# Patient Record
Sex: FEMALE | Race: WHITE | Employment: OTHER | URBAN - METROPOLITAN AREA
[De-identification: names, ages, dates, MRNs, and addresses within clinical notes are randomized per-mention and may not be internally consistent; named-entity substitution may affect disease eponyms.]

---

## 2018-07-03 ENCOUNTER — HOSPITAL ENCOUNTER (OUTPATIENT)
Dept: PHYSICAL THERAPY | Age: 68
Discharge: HOME | End: 2018-07-03
Payer: MEDICARE

## 2018-07-03 PROCEDURE — 97110 THERAPEUTIC EXERCISES: CPT

## 2018-07-03 PROCEDURE — G8981 BODY POS CURRENT STATUS: HCPCS

## 2018-07-03 PROCEDURE — G8982 BODY POS GOAL STATUS: HCPCS

## 2018-07-03 PROCEDURE — 97162 PT EVAL MOD COMPLEX 30 MIN: CPT
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
## 2018-07-03 NOTE — PROGRESS NOTES
Concepción Jones  : 1950 [facility]  [address] [place] [address]  [phone]   [phone]   OUTPATIENT ORTHOPAEDIC PHYSICAL THERAPY    NAME/AGE/GENDER: Concepción Jones is a 78 y.o. female. DATE: 7/3/2018                         Ambulatory/Rehab Services H2 Model Falls Risk Assessment    Risk Factor Pts. ·   Confusion/Disorientation/Impulsivity  []      4 ·   Symptomatic Depression  []     2 ·   Altered Elimination  []     1 ·   Dizziness/Vertigo  []     1 ·   Gender (Male)  []     1 ·   Any administered antiepileptics (anticonvulsants):  []     2 ·   Any administered benzodiazepines:  []     1 ·   Visual Impairment (specify):  []     1 ·   Portable Oxygen Use  []     1 ·   Orthostatic ? BP  []     1 ·   History of Recent Falls (within 3 mos.)  []     5     Ability to Rise from Chair (choose one) Pts. ·   Ability to rise in a single movement  []     0 ·   Pushes up, successful in one attempt  [x]     1 ·   Multiple attempts, but unsuccessful  []     3 ·   Unable to rise without assistance  []     4   Total: (5 or greater = High Risk) 1     Falls Prevention Plan:   []                  Physical Limitations to Exercise (specify):   []                  Mobility Assistance Device (type):   []                  Exercise/Equipment Adaptation (specify):    © [employer] [address] [place] [address] Patent #[id number].  Federal Law prohibits the replication, distribution or use without written permission from [employer] of [employer], OCS, Florencio Davison CFMT

## 2018-07-10 ENCOUNTER — APPOINTMENT (OUTPATIENT)
Dept: PHYSICAL THERAPY | Age: 68
End: 2018-07-10
Payer: MEDICARE

## 2018-07-10 ENCOUNTER — HOSPITAL ENCOUNTER (OUTPATIENT)
Dept: PHYSICAL THERAPY | Age: 68
Discharge: HOME | End: 2018-07-10
Payer: MEDICARE

## 2018-07-10 PROCEDURE — 97110 THERAPEUTIC EXERCISES: CPT

## 2018-07-10 PROCEDURE — 97140 MANUAL THERAPY 1/> REGIONS: CPT
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
## 2018-07-19 ENCOUNTER — HOSPITAL ENCOUNTER (OUTPATIENT)
Dept: PHYSICAL THERAPY | Age: 68
Discharge: HOME | End: 2018-07-19
Payer: MEDICARE

## 2018-07-19 ENCOUNTER — APPOINTMENT (OUTPATIENT)
Dept: PHYSICAL THERAPY | Age: 68
End: 2018-07-19
Payer: MEDICARE

## 2018-07-19 PROCEDURE — 97110 THERAPEUTIC EXERCISES: CPT

## 2018-07-19 PROCEDURE — 97140 MANUAL THERAPY 1/> REGIONS: CPT
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
## 2018-07-24 ENCOUNTER — APPOINTMENT (OUTPATIENT)
Dept: PHYSICAL THERAPY | Age: 68
End: 2018-07-24
Payer: MEDICARE

## 2018-07-24 ENCOUNTER — HOSPITAL ENCOUNTER (OUTPATIENT)
Dept: PHYSICAL THERAPY | Age: 68
Discharge: HOME | End: 2018-07-24
Payer: MEDICARE

## 2018-07-24 PROCEDURE — 97110 THERAPEUTIC EXERCISES: CPT

## 2018-07-24 PROCEDURE — 97140 MANUAL THERAPY 1/> REGIONS: CPT
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
## 2018-08-02 ENCOUNTER — HOSPITAL ENCOUNTER (OUTPATIENT)
Dept: PHYSICAL THERAPY | Age: 68
Discharge: HOME | End: 2018-08-02
Payer: MEDICARE

## 2018-08-02 PROCEDURE — 97140 MANUAL THERAPY 1/> REGIONS: CPT

## 2018-08-02 PROCEDURE — 97110 THERAPEUTIC EXERCISES: CPT
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
## 2018-08-09 ENCOUNTER — HOSPITAL ENCOUNTER (OUTPATIENT)
Dept: PHYSICAL THERAPY | Age: 68
Discharge: HOME | End: 2018-08-09
Payer: MEDICARE

## 2018-08-09 PROCEDURE — 97140 MANUAL THERAPY 1/> REGIONS: CPT
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
## 2018-08-14 ENCOUNTER — HOSPITAL ENCOUNTER (OUTPATIENT)
Dept: PHYSICAL THERAPY | Age: 68
Discharge: HOME | End: 2018-08-14
Payer: MEDICARE

## 2018-08-14 PROCEDURE — 97110 THERAPEUTIC EXERCISES: CPT

## 2018-08-14 PROCEDURE — 97140 MANUAL THERAPY 1/> REGIONS: CPT
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
## 2018-08-21 ENCOUNTER — HOSPITAL ENCOUNTER (OUTPATIENT)
Dept: PHYSICAL THERAPY | Age: 68
Discharge: HOME | End: 2018-08-21
Payer: MEDICARE

## 2018-08-21 PROCEDURE — 97110 THERAPEUTIC EXERCISES: CPT

## 2018-08-21 PROCEDURE — 97140 MANUAL THERAPY 1/> REGIONS: CPT
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
## 2018-08-22 NOTE — PROGRESS NOTES
I am accessing Ms. Stephenson's chart as a part of our department's internal chart auditing process. I certify that Ms. Robyn Santos is, or was, a patient in our department.   Thank you,  Garrick Barker, PT  8/22/2018

## 2018-08-29 NOTE — PROGRESS NOTES
I am accessing Ms. Stephenson's chart as a part of our department's internal chart auditing process. I certify that Ms. Barbara Rios is, or was, a patient in our department.   Thank you,  Shadi Koroma, PT  8/29/2018

## 2018-08-30 ENCOUNTER — HOSPITAL ENCOUNTER (OUTPATIENT)
Dept: PHYSICAL THERAPY | Age: 68
Discharge: HOME | End: 2018-08-30
Payer: MEDICARE

## 2018-08-30 PROCEDURE — 97110 THERAPEUTIC EXERCISES: CPT

## 2018-08-30 PROCEDURE — 97140 MANUAL THERAPY 1/> REGIONS: CPT
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
## 2018-09-06 ENCOUNTER — HOSPITAL ENCOUNTER (OUTPATIENT)
Dept: PHYSICAL THERAPY | Age: 68
Discharge: HOME | End: 2018-09-06
Payer: MEDICARE

## 2018-09-06 PROCEDURE — 97140 MANUAL THERAPY 1/> REGIONS: CPT

## 2018-09-06 PROCEDURE — 97110 THERAPEUTIC EXERCISES: CPT
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
## 2018-09-11 ENCOUNTER — HOSPITAL ENCOUNTER (OUTPATIENT)
Dept: PHYSICAL THERAPY | Age: 68
Discharge: HOME | End: 2018-09-11
Payer: MEDICARE

## 2018-09-11 PROCEDURE — G8981 BODY POS CURRENT STATUS: HCPCS

## 2018-09-11 PROCEDURE — G8982 BODY POS GOAL STATUS: HCPCS

## 2018-09-11 PROCEDURE — 97140 MANUAL THERAPY 1/> REGIONS: CPT

## 2018-09-11 PROCEDURE — 97110 THERAPEUTIC EXERCISES: CPT
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
## 2018-09-25 ENCOUNTER — HOSPITAL ENCOUNTER (OUTPATIENT)
Dept: PHYSICAL THERAPY | Age: 68
Discharge: HOME | End: 2018-09-25
Payer: MEDICARE

## 2018-09-25 PROCEDURE — 97140 MANUAL THERAPY 1/> REGIONS: CPT

## 2018-09-25 PROCEDURE — 97110 THERAPEUTIC EXERCISES: CPT
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
## 2018-10-25 ENCOUNTER — APPOINTMENT (OUTPATIENT)
Dept: PHYSICAL THERAPY | Age: 68
End: 2018-10-25

## 2018-10-31 ENCOUNTER — APPOINTMENT (OUTPATIENT)
Dept: PHYSICAL THERAPY | Age: 68
End: 2018-10-31

## 2018-11-07 ENCOUNTER — HOSPITAL ENCOUNTER (OUTPATIENT)
Dept: PHYSICAL THERAPY | Age: 68
Discharge: HOME | End: 2018-11-07
Payer: MEDICARE

## 2018-11-07 PROCEDURE — 97110 THERAPEUTIC EXERCISES: CPT

## 2018-11-07 PROCEDURE — G8982 BODY POS GOAL STATUS: HCPCS

## 2018-11-07 PROCEDURE — 97140 MANUAL THERAPY 1/> REGIONS: CPT

## 2018-11-07 PROCEDURE — G8983 BODY POS D/C STATUS: HCPCS
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
## 2022-03-29 NOTE — THERAPY EVALUATION
Cecilio Balderas  : 1950  [place]  Secondary: [place] [id number] at [facility]  [address] [place] [address]  [phone]   [phone]        OUTPATIENT PHYSICAL THERAPY:Initial Assessment 3/30/2022   ICD-10: Treatment Diagnosis: Lack of coordination (muscle incoordination) (R27.8), Pelvic floor dysfunction in female (M62.98), Generalized weakness (M62.81) and Urge incontinence (N39.41)  Precautions/Allergies:   Bactrim [sulfamethoprim], Ciprofloxacin, and Pcn [penicillins]   TREATMENT PLAN:  Effective Dates: 3/30/2022 TO 2022 (90 days). Frequency/Duration: 1 time a week for 90 Day(s) MEDICAL/REFERRING DIAGNOSIS:  Interstitial cystitis (chronic) without hematuria [N30.10]   DATE OF ONSET:   REFERRING PHYSICIAN: Bret Reddy MD Orders: evaluate and treat  Return MD Appointment: 22     INITIAL ASSESSMENT: Cecilio Balderas presents with musculoskeletal limitations including reduced coordination and awareness of PFM and decreased pelvic floor muscle (PFM) strength. These limitations are impairing the patient's ability to properly coordinate perineal elevation and descent for normalized PFM function, contributing to urinary dysfunction. As a result, the patient is limited in her/his ability to participate in household chores, traveling by car or bus for a distance greater then 30 minutes away from home and social activities outside of the home. PROBLEM LIST (Impacting functional limitations):  Decreased Swisher with Home Exercise Program  Decreased coordination  Decreased strength of pelvic floor which limits bladder control   INTERVENTIONS PLANNED:  1. Family Education  2. Home Exercise Program (HEP)  3. Manual Therapy  4. Neuromuscular Re-education/Strengthening  5. Therapeutic Activites  6.  Therapeutic Exercise/Strengthening     GOALS: (Goals have been discussed and agreed upon with patient.)  Short-Term Functional Goals: Time Frame:   1. Pt will demonstrate I with basic PFM HEP to improve awareness, coordination, and timing of PFM. 2. Patient will demonstrate ability to isolate a pelvic floor contraction without breath holding and minimal to no accessory muscle use in order to implement the knack and/or urge suppression  3. Patient will demonstrate appropriate use of the pelvic floor muscle group (quick flicks and/or drops), without compensation, to implement urge suppression appropriately with urgency of urination and decrease the number of pads per day or UI episodes  4. Patient will demonstrate appropriate co-contraction of the transversus abdominis and pelvic floor muscle group during exhalation in order to reduce IAP and improve functional task performance   Discharge Goals: Time Frame:   1. Patient will demonstrate independence with an advanced HEP for general conditioning, core stabilization, and mobility to facilitate carry over and independent management of symptoms. OUTCOME MEASURE:   Tool Used: Pelvic Floor Impact Questionnaireshort form 7 (PFIQ-7)   Score:  Initial:   · Bladder or Urine: 42/100  · Bowel or Rectum: 0/100  · Vagina or Pelvis: 0/100 Most Recent: X (Date: -- )  · Bladder or Urine: X  · Bowel or Rectum: X  · Vagina or Pelvis: X   Interpretation of Score: Each of the 7 sections is scored on a scale from 0-3; 0 representing \"Not at all\", 3 representing \"Quite a bit\". The mean value is taken from all the answered items, then multiplied by 100 to obtain the scale score, ranging from 0-100. This process is repeated for each column representing bowel, bladder, and pelvic pain. Medical Necessity:   · Patient demonstrates GOOD rehab potential due to higher previous functional level. Reason for Services/Other Comments:  · Patient continues to require skilled intervention due to above mentioned deficits  .   Total Duration:   PT Patient Time In/Time Out  Time In: 1300  Time Out: 1400    Rehabilitation Potential For Stated Goals: Good  Regarding Saul Stephenson's therapy, I certify that the treatment plan above will be carried out by a therapist or under their direction. Thank you for this referral,  Asaf Lovett DPT     Referring Physician Signature: Dane Cox,* _______________________________ Date _____________              PAIN/SUBJECTIVE:   Initial:   0 Post Session:  0/10     HISTORY:   History of Present Injury/Illness (Reason for Referral):  Ms. Rigo Palm is a 81 yo female referred to pelvic floor physical therapy (PFPT) by Dane Cox,* 2/2 IC. Pt reports that symptoms began 2010. It started with an ache that intensified. She has been to several doctors. Gynecologists didn't know what to do. Went to urogynecologist which is when they made the diagnosis. Has tried bladder instillations which did not help. She ended up at pain management as was put on tramadol. Her gynecologist put her on elmiron. This combined with diet got things under control. She is avoiding tomatoes, citrus, berries, pecans. This past December her internist saw that Elmiron was not good and was told to get off of it. She has gradually weaned off of that. She also went to [facility] who then recommended PT, marshmallow root, and aloe vera. She hasn't had any pain. She is here without symptoms. Urinary: Frequency 5-8 x/day, 1-2 x/night. Positive for urge urinary incontinence. Negative for stress urinary incontinence, urinary urgency, urinary frequency, incomplete emptying, urinary hesitancy/intermittency, dysuria, hematuria, nocturia and nocturnal enuresis. Pt does use pads for urinary protection; 1 pads per day (PPD) (if she is going out or on a trip). Fluid intake: 1 liters water/day; bladder irritants include: none. Pt does not limit fluid intake due to bladder control. Bowel: Frequency once every 2-3 days.  Positive for pain with bowel movement, pushing/straining with bowel movement and constipation. Negative for fecal incontinence and incomplete emptying. Pt does not use pads for bowel protection; 0 pads per day (PPD). Use of stool softeners or laxatives? NO. Uses [place] wafer ever morning (fiber). Has IBS which is controlled by medicine  Sexual: Pt is not sexually active  positive for dyspareunia (history of). Pelvic Organ Prolapse/Pelvic Pain: Location: none. OB History: Number of pregnancies: 1 Number of vaginal deliveries: 1 Number of c-sections: 1. Past Medical History/Comorbidities:   Ms. Erlinda Sparrow  has a past medical history of GERD (gastroesophageal reflux disease), Hypertension, Musculoskeletal disorder, and Psychiatric disorder. Ms. Erlinda Sparrow  has a past surgical history that includes pr amputation low leg thru tib/fib; pr partial hip replacement (Right, 11/20/1/2017); and hx pacemaker (06/10/2015). Social History/Living Environment:      Have you ever had any pelvic trauma (orthopedic in nature, fall, MVA, etc.)? 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 NO   Have you ever experienced any form of medical trauma (GYN, urological, GI, etc)? NO     Prior Level of Function/Work/Activity:  Prior level of function: Modified Independent uses WC for mobility   Occupation: not working  Exercise activities: not anymore    Personal Factors:          Sex:  female        Age:  80 y.o.      Ambulatory/Rehab Services H2 Model Falls Risk Assessment    Risk Factors:       No Risk Factors Identified Ability to Rise from Chair:       (0)  Ability to rise in a single movement    Falls Prevention Plan:       No modifications necessary   Total: (5 or greater = High Risk): 0 ©2010 [employer] [place] . [employer] Patent #[id number]. Federal Law prohibits the replication, distribution or use without written permission from [employer]     Current Medications:       Current Outpatient Medications:     gabapentin (NEURONTIN) 300 mg capsule, Take 300 mg by mouth three (3) times daily. , Disp: , Rfl:     traZODone (DESYREL) 50 mg tablet, Take 50 mg by mouth nightly.  , Disp: , Rfl:     pentosan polysulfate sodium (ELMIRON) 100 mg capsule, Take 100 mg by mouth two (2) times a day.  , Disp: , Rfl:     sertraline (ZOLOFT) 50 mg tablet, Take  by mouth daily. , Disp: , Rfl:     LORazepam (ATIVAN) 0.5 mg tablet, Take 0.5 mg by mouth.  , Disp: , Rfl:     temazepam (RESTORIL) 30 mg capsule, Take 30 mg by mouth nightly.  , Disp: , Rfl:     levothyroxine (SYNTHROID) 200 mcg tablet, Take 200 mcg by mouth Daily (before breakfast). , Disp: , Rfl:     lidocaine (LIDODERM) 5 %(700 mg/patch), 1 Patch by TransDERmal route every twenty-four (24) hours. , Disp: , Rfl:     estradiol (ESTRACE) 0.5 mg tablet, Take  by mouth daily.   , Disp: , Rfl:     CALCIUM CITRATE (CITRACAL PO), Take  by mouth.  , Disp: , Rfl:     tamoxifen (NOLVADEX) 20 mg tablet, Take 20 mg by mouth two (2) times a day.  , Disp: , Rfl:     Dexlansoprazole (DEXILANT) 60 mg CpDM, Take  by mouth.  , Disp: , Rfl:     oxyCODONE-acetaminophen (PERCOCET) 2.5-325 mg per tablet, Take 1 Tab by mouth every six (6) hours as needed for Pain., Disp: 10 Tab, Rfl: 0   Date Last Reviewed:  3/30/2022   Number of Personal Factors/Comorbidities that affect the Plan of Care: 0: LOW COMPLEXITY   EXAMINATION:   OBSERVATION:   External Observations:   Voluntary contraction: [] absent     [x] present   Involuntary contraction: [] absent     [x] present   Involuntary relaxation: [] absent     [x] present   Perineal descent at rest: [] absent     [x] present   Perineal descent with bearing: [] absent     [x] present   Perineum during cough \"the Knack\" - intact   Clitoral Roach Mobility: decreased   Postural assessment: bilateral BKA's   Gait: uses WC for longer ambulation    Pelvic Floor Muscle (PFM) Assessment:   Vaginal vault size: [] decreased     [] increased     [x] WNL   Muscle volume: [] decreased     [x] WNL     [] Defect   PFM tension: [x] decreased     [] increased     [] WNL    Contraction ability:  Voluntary contraction: [] absent     [x] weak     [] moderate      [] strong  Voluntary relaxation: [] absent     [] partial     [x] complete   MMT: 2/5   PFM endurance: 3 seconds   Repetitions of endurance contractions: 5  Overflow: [] absent     [] min     [] mod     [] severe / Compensatory mm groups include       Tissue laxity test:  Anterior wall: [] minimum     [] moderate     [] severe      [x] WNL  Posterior wall: [] minimum     [] moderate     [] severe      [x] WNL      Palpation: via vaginal canal   Superficial Pelvic Floor Musculature (PFM): Tender? Intermediate PFM Tender? Deep PFM Tender? Superficial Transverse Perineal [] Right  [] Left  []DNT Deep Transverse Perineal [] Right  [] Left  []DNT Puborectalis [] Right  [] Left  []DNT   Ischiocavernosus [] Right  [] Left  []DNT Compressor Urethra [] Right  [] Left  []DNT Pubococcygeus [] Right  [] Left  []DNT   Bulbocavernosus [] Right  [] Left  []DNT   Ileococcygeus [] Right  [] Left  []DNT       Obturator Internus [] Right  [] Left  []DNT       Coccygeus [] Right  [] Left  []DNT       Diastasis Recti    At umbilicus    1 inch above umbilicus    1 inch below umbilicus    TA contraction      Q tip test for vestibular tenderness Pain score 0-10   2 o'clock    4 o'clock    6 o'clock    8 o'clock    10 o'clock    12 o'clock    Composite score           Body Structures Involved:  1. Muscles Body Functions Affected:  1. Genitourinary  2. Neuromusculoskeletal  3. Movement Related Activities and Participation Affected:  1. Mobility  2.  Self Care  3. Domestic Life  4.  Community, Social and [place]   Number of elements (examined above) that affect the Plan of Care: 1-2: LOW COMPLEXITY   CLINICAL PRESENTATION:   Presentation: Stable and uncomplicated: LOW COMPLEXITY   CLINICAL DECISION MAKING:   Use of outcome tool(s) and clinical judgement create a POC that gives a: Clear prediction of patient's progress: LOW COMPLEXITY

## 2022-03-29 NOTE — PROGRESS NOTES
Naveen Johnson  : 1950  [place]  Secondary: [facility]  [address] [place] [address]  [phone]   [phone]        OUTPATIENT PHYSICAL THERAPY: Daily Treatment Note 3/30/2022  ICD-10: Treatment Diagnosis: Lack of coordination (muscle incoordination) (R27.8), Pelvic floor dysfunction in female (M62.98), Generalized weakness (M62.81) and Urge incontinence (N39.41)  Precautions/Allergies:   Bactrim [sulfamethoprim], Ciprofloxacin, and Pcn [penicillins]   TREATMENT PLAN:  Effective Dates: 3/30/2022 TO 2022 (90 days). Frequency/Duration: 1 time a week for 90 Day(s) MEDICAL/REFERRING DIAGNOSIS:  Interstitial cystitis (chronic) without hematuria [N30.10]   DATE OF ONSET:   REFERRING PHYSICIAN: Julio César Olivo MD Orders: evaluate and treat  Return MD Appointment: 22     Pre-treatment Symptoms/Complaints:  see evaluation  Pain: Initial:   0 Post Session:  0/10   Medications Last Reviewed:  3/30/2022   Updated Objective Findings:  See evaluation note from today   TREATMENT:   THERAPEUTIC EXERCISE: (10 minutes):  Exercises per grid below to improve mobility, strength, and coordination. Required minimal visual, verbal, and manual cues to promote proper body alignment, promote proper body posture, and promote proper body mechanics. Progressed resistance, range, and repetitions as indicated. All exercises performed with emphasis on kegel and breathing in order improve coordination, awareness and to minimize symptoms. Date:  3-30-22 Date:   Date:     Activity/Exercise Parameters Parameters Parameters   Patient Education Discussed HEP and POC     kegels Quick flicks and endurance holds                                      Pt gives verbal consent to internal  assessment/treatment no chaperon present.      NEURO REEDUCATION: () to improve control and coordination of pelvic floor     Date: Date:   Date:     Activity/Exercise Parameters Parameters Parameters   Biofeedback With sEMG was utilized for coordination of PFM. MANUAL THERAPY: () to improve soft tissue tone    Date Type Location Comments   3/30/2022 Internal assessment/treatment                                           (Used abbreviations: MET - muscle energy technique; SCS- Strain counter strain; CTM-Connective tissue mobilizations; CR- Contract/relax; SP- Sustained pressure, TrP-Trigger point release, IASTM- Instrument assisted soft tissue mobilizations, TDN-Trigger point dry needling)      [facility]     Treatment/Session Summary:  Pt reports good understanding of plan of care, as well as prescribed home exercise program.  All questions were answered to pt's satisfaction. Pt was invited to call with any further questions or concerns. · Response to Treatment:  see evaluation. · Communication/Consultation:  None today  · Equipment provided today:  None today  · Recommendations/Intent for next treatment session: Next visit will focus on biofeedback and coordination of PFM. · Variation from POC: N/A  Total Treatment Billable Duration:  10 minutes  PT Patient Time In/Time Out  Time In: 1300  Time Out: [address], DPT    No future appointments.

## 2022-03-30 ENCOUNTER — HOSPITAL ENCOUNTER (OUTPATIENT)
Dept: PHYSICAL THERAPY | Age: 72
Discharge: HOME | End: 2022-03-30
Payer: MEDICARE

## 2022-03-30 PROCEDURE — 97110 THERAPEUTIC EXERCISES: CPT

## 2022-03-30 PROCEDURE — 97161 PT EVAL LOW COMPLEX 20 MIN: CPT

## 2022-04-04 NOTE — PROGRESS NOTES
Chicagoosvaldo Ureña  : 1950  [place]  Secondary: [facility]  [address] [place] [address]  [phone]   [phone]        OUTPATIENT PHYSICAL THERAPY: Daily Treatment Note 2022  ICD-10: Treatment Diagnosis: Lack of coordination (muscle incoordination) (R27.8), Pelvic floor dysfunction in female (M62.98), Generalized weakness (M62.81) and Urge incontinence (N39.41)  Precautions/Allergies:   Bactrim [sulfamethoprim], Ciprofloxacin, and Pcn [penicillins]   TREATMENT PLAN:  Effective Dates: 3/30/2022 TO 2022 (90 days). Frequency/Duration: 1 time a week for 90 Day(s) MEDICAL/REFERRING DIAGNOSIS:  Interstitial cystitis (chronic) without hematuria [N30.10]   DATE OF ONSET:   REFERRING PHYSICIAN: Sae Krueger MD Orders: evaluate and treat  Return MD Appointment: 22     Pre-treatment Symptoms/Complaints:  Patient reports no changes since last time. Continues to have leakage getting to the bathroom. Has been doing the exercises. Pain: Initial:   0 Post Session:  0/10   Medications Last Reviewed:  2022   Updated Objective Findings:   Ms. Rigo Palm is a 81 yo female referred to pelvic floor physical therapy (PFPT) by Sae Krueger,* 2/2 IC. Pt reports that symptoms began . It started with an ache that intensified. She has been to several doctors. Gynecologists didn't know what to do. Went to urogynecologist which is when they made the diagnosis. Has tried bladder instillations which did not help. She ended up at pain management as was put on tramadol. Her gynecologist put her on elmiron. This combined with diet got things under control. She is avoiding tomatoes, citrus, berries, pecans. This past December her internist saw that Elmiron was not good and was told to get off of it. She has gradually weaned off of that.  She also went to [facility] who then recommended PT, marshmallow root, and aloe vera. She hasn't had any pain. She is here without symptoms.       Urinary: Frequency 5-8 x/day, 1-2 x/night. Positive for urge urinary incontinence. Negative for stress urinary incontinence, urinary urgency, urinary frequency, incomplete emptying, urinary hesitancy/intermittency, dysuria, hematuria, nocturia and nocturnal enuresis. Pt does use pads for urinary protection; 1 pads per day (PPD) (if she is going out or on a trip). Fluid intake: 1 liters water/day; bladder irritants include: none. Pt does not limit fluid intake due to bladder control. Bowel: Frequency once every 2-3 days. Positive for pain with bowel movement, pushing/straining with bowel movement and constipation. Negative for fecal incontinence and incomplete emptying. Pt does not use pads for bowel protection; 0 pads per day (PPD). Use of stool softeners or laxatives? NO. Uses [place] wafer ever morning (fiber). Has IBS which is controlled by medicine  Sexual: Pt is not sexually active  positive for dyspareunia (history of). Pelvic Organ Prolapse/Pelvic Pain: Location: none. OB History: Number of pregnancies: 1 Number of vaginal deliveries: 1 Number of c-sections: 1. External Observations:  · Voluntary contraction: []? absent     [x]? present  · Involuntary contraction: []? absent     [x]? present  · Involuntary relaxation: []? absent     [x]? present  · Perineal descent at rest: []? absent     [x]? present  · Perineal descent with bearing: []? absent     [x]? present  · Perineum during cough \"the Knack\" - intact  · Clitoral Roach Mobility: decreased  · Postural assessment: bilateral BKA's  · Gait: uses WC for longer ambulation     Pelvic Floor Muscle (PFM) Assessment:  · Vaginal vault size: []? decreased     []? increased     [x]? WNL  · Muscle volume: []? decreased     [x]? WNL     []? Defect  · PFM tension: [x]? decreased     []? increased     []?  WNL     Contraction ability:  Voluntary contraction: []? absent     [x]? weak     []? moderate      []? strong  Voluntary relaxation: []? absent     []? partial     [x]? complete   MMT: 2/5   PFM endurance: 3 seconds   Repetitions of endurance contractions: 5  Overflow: []? absent     []? min     []? mod     []? severe / Compensatory mm groups include         Tissue laxity test:  Anterior wall: []? minimum     []? moderate     []? severe      [x]? WNL  Posterior wall: []? minimum     []? moderate     []? severe      [x]? WNL        Palpation: via vaginal canal   Superficial Pelvic Floor Musculature (PFM): Tender? Intermediate PFM Tender? Deep PFM Tender? Superficial Transverse Perineal []? Right  []? Left  []? DNT Deep Transverse Perineal []? Right  []? Left  []? DNT Puborectalis []? Right  []? Left  []? DNT   Ischiocavernosus []? Right  []? Left  []? DNT Compressor Urethra []? Right  []? Left  []? DNT Pubococcygeus []? Right  []? Left  []? DNT   Bulbocavernosus []? Right  []? Left  []? DNT     Ileococcygeus []? Right  []? Left  []? DNT           Obturator Internus []? Right  []? Left  []? DNT           Coccygeus []? Right  []? Left  []? DNT              TREATMENT:   THERAPEUTIC EXERCISE: (15 minutes):  Exercises per grid below to improve mobility, strength, and coordination. Required minimal visual, verbal, and manual cues to promote proper body alignment, promote proper body posture, and promote proper body mechanics. Progressed resistance, range, and repetitions as indicated. All exercises performed with emphasis on kegel and breathing in order improve coordination, awareness and to minimize symptoms.     Date:  3-30-22 Date:  4-5-22 Date:     Activity/Exercise Parameters Parameters Parameters   Patient Education Discussed HEP and POC     kegels Quick flicks and endurance holds     Supine hip adduction ball squeeze    x20    Supine hip abduction with band    x20 purple band    2nd position bridge    x20    Seated march  x20 B             Pt gives verbal consent to internal assessment/treatment no chaperon present. NEURO REEDUCATION: () to improve control and coordination of pelvic floor     Date:   Date:   Date:     Activity/Exercise Parameters Parameters Parameters   Biofeedback With sEMG was utilized for coordination of PFM. MANUAL THERAPY: () to improve soft tissue tone    Date Type Location Comments   4/5/2022 Internal assessment/treatment                                           (Used abbreviations: MET - muscle energy technique; SCS- Strain counter strain; CTM-Connective tissue mobilizations; CR- Contract/relax; SP- Sustained pressure, TrP-Trigger point release, IASTM- Instrument assisted soft tissue mobilizations, TDN-Trigger point dry needling)    THERAPEUTIC ACTIVITY: ( 45 minutes):   TA Educational Topic Notes Date Completed   Pathology/Anatomy/PFM Function     Bladder health education Reviewed for IC prevention and care. Diet, water intake, marshmallow root/aloe vera. Given website [employer] 4-5-22   Urinary urge suppression Reviewed and given handout 4-5-22   The knack     Voiding strategies     Nocturia tips     Bowel/Bladder log     Bowel health education     Constipation care     Diarrhea/Fecal leakage     Colonic massage     Toilet positioning     Defecation dynamics     Sources of fiber     Return to intercourse/Dilator training     Sexual positioning     Lubricants/vaginal moisturizers     Vaginal irritants Educated on vaginal hygiene to prevent UTI 4-5-22   Body mechanics     Posture/ergonomics     Diaphragmatic breathing     Resources and technology         [employer] Portal     Treatment/Session Summary:  Pt reports good understanding of plan of care, as well as prescribed home exercise program.  All questions were answered to pt's satisfaction. Pt was invited to call with any further questions or concerns. · Response to Treatment:  Patient understands education with IC.  Updated HEP  · Communication/Consultation: None today  · Equipment provided today:  None today  · Recommendations/Intent for next treatment session: Next visit will focus on coordination of PFM.   · Variation from POC: N/A  Total Treatment Billable Duration:  10 minutes there ex, 45 minutes there act  PT Patient Time In/Time Out  Time In: 1300  Time Out: [address], DPT    Future Appointments   Date Time Provider Marin Flores   4/21/2022  1:00 PM Mady Colon, DPT [facility]   4/27/2022  2:00 PM Mady Colon, DPT [facility]   5/3/2022  2:00 PM Mady Colon, DPT [facility]   5/10/2022  2:00 PM Mady Colon, DPT [facility]   5/17/2022  1:00 PM Mady Colon, DPT [facility]

## 2022-04-05 ENCOUNTER — HOSPITAL ENCOUNTER (OUTPATIENT)
Dept: PHYSICAL THERAPY | Age: 72
Discharge: HOME | End: 2022-04-05
Payer: MEDICARE

## 2022-04-05 PROCEDURE — 97530 THERAPEUTIC ACTIVITIES: CPT

## 2022-04-05 PROCEDURE — 97110 THERAPEUTIC EXERCISES: CPT

## 2022-04-21 ENCOUNTER — HOSPITAL ENCOUNTER (OUTPATIENT)
Dept: PHYSICAL THERAPY | Age: 72
Discharge: HOME | End: 2022-04-21
Payer: MEDICARE

## 2022-04-21 NOTE — PROGRESS NOTES
Shamika Sky  : 1950  [place]  Secondary: [place] [id number] at [facility]  [address] [place] [address]  [phone]   [phone]                  DATE: 2022    Patient canceled appointment due to stomach virus    MARSHALL MarroquinT

## 2022-04-26 NOTE — PROGRESS NOTES
Shamika Sky  : 1950  [place]  [place] [facility] [place] [address] at [facility]  [address] [place] [address]  [phone]   [phone]        OUTPATIENT PHYSICAL THERAPY: Daily Treatment Note 2022  ICD-10: Treatment Diagnosis: Lack of coordination (muscle incoordination) (R27.8), Pelvic floor dysfunction in female (M62.98), Generalized weakness (M62.81) and Urge incontinence (N39.41)  Precautions/Allergies:   Bactrim [sulfamethoprim], Ciprofloxacin, and Pcn [penicillins]   TREATMENT PLAN:  Effective Dates: 3/30/2022 TO 2022 (90 days). Frequency/Duration: 1 time a week for 90 Day(s) MEDICAL/REFERRING DIAGNOSIS:  Interstitial cystitis (chronic) without hematuria [N30.10]   DATE OF ONSET:   REFERRING PHYSICIAN: Casandra Leary MD Orders: evaluate and treat  Return MD Appointment: 22     Pre-treatment Symptoms/Complaints:  Patient reports she has tried to do the exercises. Some days she can get to the bathroom and other days she cannot. Its been about 2-3 months without any IC symptoms. Pain: Initial:   0 Post Session:  0/10   Medications Last Reviewed:  2022   Updated Objective Findings:   Ms. Sylvester Plaza is a 79 yo female referred to pelvic floor physical therapy (PFPT) by Casandra Leary,* 2/2 IC. Pt reports that symptoms began . It started with an ache that intensified. She has been to several doctors. Gynecologists didn't know what to do. Went to urogynecologist which is when they made the diagnosis. Has tried bladder instillations which did not help. She ended up at pain management as was put on tramadol. Her gynecologist put her on elmiron. This combined with diet got things under control. She is avoiding tomatoes, citrus, berries, pecans. This past December her internist saw that Elmiron was not good and was told to get off of it. She has gradually weaned off of that.  She also went to [facility] who then recommended PT, marshmallow root, and aloe vera. She hasn't had any pain. She is here without symptoms.       Urinary: Frequency 5-8 x/day, 1-2 x/night. Positive for urge urinary incontinence. Negative for stress urinary incontinence, urinary urgency, urinary frequency, incomplete emptying, urinary hesitancy/intermittency, dysuria, hematuria, nocturia and nocturnal enuresis. Pt does use pads for urinary protection; 1 pads per day (PPD) (if she is going out or on a trip). Fluid intake: 1 liters water/day; bladder irritants include: none. Pt does not limit fluid intake due to bladder control. Bowel: Frequency once every 2-3 days. Positive for pain with bowel movement, pushing/straining with bowel movement and constipation. Negative for fecal incontinence and incomplete emptying. Pt does not use pads for bowel protection; 0 pads per day (PPD). Use of stool softeners or laxatives? NO. Uses [place] wafer ever morning (fiber). Has IBS which is controlled by medicine  Sexual: Pt is not sexually active  positive for dyspareunia (history of). Pelvic Organ Prolapse/Pelvic Pain: Location: none. OB History: Number of pregnancies: 1 Number of vaginal deliveries: 1 Number of c-sections: 1. External Observations:  · Voluntary contraction: []? absent     [x]? present  · Involuntary contraction: []? absent     [x]? present  · Involuntary relaxation: []? absent     [x]? present  · Perineal descent at rest: []? absent     [x]? present  · Perineal descent with bearing: []? absent     [x]? present  · Perineum during cough \"the Knack\" - intact  · Clitoral Roach Mobility: decreased  · Postural assessment: bilateral BKA's  · Gait: uses WC for longer ambulation     Pelvic Floor Muscle (PFM) Assessment:  · Vaginal vault size: []? decreased     []? increased     [x]? WNL  · Muscle volume: []? decreased     [x]? WNL     []? Defect  · PFM tension: [x]? decreased     []? increased     []?  WNL     Contraction ability:  Voluntary contraction: []? absent     [x]? weak     []? moderate      []? strong  Voluntary relaxation: []? absent     []? partial     [x]? complete   MMT: 2/5   PFM endurance: 3 seconds   Repetitions of endurance contractions: 5  Overflow: []? absent     []? min     []? mod     []? severe / Compensatory mm groups include         Tissue laxity test:  Anterior wall: []? minimum     []? moderate     []? severe      [x]? WNL  Posterior wall: []? minimum     []? moderate     []? severe      [x]? WNL        Palpation: via vaginal canal   Superficial Pelvic Floor Musculature (PFM): Tender? Intermediate PFM Tender? Deep PFM Tender? Superficial Transverse Perineal []? Right  []? Left  []? DNT Deep Transverse Perineal []? Right  []? Left  []? DNT Puborectalis []? Right  []? Left  []? DNT   Ischiocavernosus []? Right  []? Left  []? DNT Compressor Urethra []? Right  []? Left  []? DNT Pubococcygeus []? Right  []? Left  []? DNT   Bulbocavernosus []? Right  []? Left  []? DNT     Ileococcygeus []? Right  []? Left  []? DNT           Obturator Internus []? Right  []? Left  []? DNT           Coccygeus []? Right  []? Left  []? DNT              TREATMENT:   THERAPEUTIC EXERCISE: (30 minutes):  Exercises per grid below to improve mobility, strength, and coordination. Required minimal visual, verbal, and manual cues to promote proper body alignment, promote proper body posture, and promote proper body mechanics. Progressed resistance, range, and repetitions as indicated. All exercises performed with emphasis on kegel and breathing in order improve coordination, awareness and to minimize symptoms.     Date:  3-30-22 Date:  4-5-22 Date:  4-27-22   Activity/Exercise Parameters Parameters Parameters   Patient Education Discussed HEP and POC     kegels Quick flicks and endurance holds     Supine hip adduction ball squeeze    x20 x20   Supine hip abduction with band    x20 purple band x20 purple band   2nd position bridge    x20 x20 Seated march  x20 B x20 B purple band   SLR   x20 B     clamshells   x20 B        Pt gives verbal consent to internal  assessment/treatment no chaperon present. NEURO REEDUCATION: () to improve control and coordination of pelvic floor     Date:   Date:   Date:     Activity/Exercise Parameters Parameters Parameters   Biofeedback With sEMG was utilized for coordination of PFM. MANUAL THERAPY: () to improve soft tissue tone    Date Type Location Comments   4/27/2022 Internal assessment/treatment                                           (Used abbreviations: MET - muscle energy technique; SCS- Strain counter strain; CTM-Connective tissue mobilizations; CR- Contract/relax; SP- Sustained pressure, TrP-Trigger point release, IASTM- Instrument assisted soft tissue mobilizations, TDN-Trigger point dry needling)    THERAPEUTIC ACTIVITY: ( 25 minutes):   TA Educational Topic Notes Date Completed   Pathology/Anatomy/PFM Function     Bladder health education Reviewed for IC prevention and care. Diet, water intake, marshmallow root/aloe vera. Given website [employer] 4-5-22 4-27-22   Urinary urge suppression Reviewed and given handout 4-5-22   The knack     Voiding strategies     Nocturia tips     Bowel/Bladder log     Bowel health education     Constipation care     Diarrhea/Fecal leakage     Colonic massage     Toilet positioning     Defecation dynamics     Sources of fiber     Return to intercourse/Dilator training     Sexual positioning     Lubricants/vaginal moisturizers     Vaginal irritants Educated on vaginal hygiene to prevent UTI 4-5-22   Body mechanics     Posture/ergonomics     Diaphragmatic breathing     Resources and technology         [employer] Portal     Treatment/Session Summary:  Pt reports good understanding of plan of care, as well as prescribed home exercise program.  All questions were answered to pt's satisfaction.  Pt was invited to call with any further questions or concerns. · Response to Treatment:  Patient understands education with IC. Continued to progress there ex. · Communication/Consultation:  None today  · Equipment provided today:  None today  · Recommendations/Intent for next treatment session: Next visit will focus on coordination of PFM.   · Variation from POC: N/A  Total Treatment Billable Duration:  30 minutes there ex, 25 minutes there act  PT Patient Time In/Time Out  Time In: 1400  Time Out: [address], [place]    Future Appointments   Date Time Provider Marin Flores   5/3/2022  2:00 PM Mady Colon DPT [facility]   5/10/2022  2:00 PM Mady Colon DPT [facility]   5/17/2022  1:00 PM Mady Colon DPT [facility]

## 2022-04-27 ENCOUNTER — HOSPITAL ENCOUNTER (OUTPATIENT)
Dept: PHYSICAL THERAPY | Age: 72
Discharge: HOME | End: 2022-04-27
Payer: MEDICARE

## 2022-04-27 PROCEDURE — 97110 THERAPEUTIC EXERCISES: CPT

## 2022-04-27 PROCEDURE — 97530 THERAPEUTIC ACTIVITIES: CPT

## 2022-05-02 NOTE — PROGRESS NOTES
Monique Gil  : 1950  [place]  Secondary: [facility]  [address] [place] [address]  [phone]   [phone]        OUTPATIENT PHYSICAL THERAPY: Daily Treatment Note 5/3/2022  ICD-10: Treatment Diagnosis: Lack of coordination (muscle incoordination) (R27.8), Pelvic floor dysfunction in female (M62.98), Generalized weakness (M62.81) and Urge incontinence (N39.41)  Precautions/Allergies:   Bactrim [sulfamethoprim], Ciprofloxacin, and Pcn [penicillins]   TREATMENT PLAN:  Effective Dates: 3/30/2022 TO 2022 (90 days). Frequency/Duration: 1 time a week for 90 Day(s) MEDICAL/REFERRING DIAGNOSIS:  Interstitial cystitis (chronic) without hematuria [N30.10]   DATE OF ONSET:   REFERRING PHYSICIAN: Colton Corcoran MD Orders: evaluate and treat  Return MD Appointment: 22     Pre-treatment Symptoms/Complaints:  Patient reports she has been good. She is still not having any IC symptoms. Pain: Initial:   0 Post Session:  0/10   Medications Last Reviewed:  5/3/2022   Updated Objective Findings:   Ms. Rosa Elena Butt is a 81 yo female referred to pelvic floor physical therapy (PFPT) by Colton Corcoran,* 2/2 IC. Pt reports that symptoms began . It started with an ache that intensified. She has been to several doctors. Gynecologists didn't know what to do. Went to urogynecologist which is when they made the diagnosis. Has tried bladder instillations which did not help. She ended up at pain management as was put on tramadol. Her gynecologist put her on elmiron. This combined with diet got things under control. She is avoiding tomatoes, citrus, berries, pecans. This past December her internist saw that Elmiron was not good and was told to get off of it. She has gradually weaned off of that. She also went to [facility] who then recommended PT, marshmallow root, and aloe vera.  She hasn't had any pain. She is here without symptoms.       Urinary: Frequency 5-8 x/day, 1-2 x/night. Positive for urge urinary incontinence. Negative for stress urinary incontinence, urinary urgency, urinary frequency, incomplete emptying, urinary hesitancy/intermittency, dysuria, hematuria, nocturia and nocturnal enuresis. Pt does use pads for urinary protection; 1 pads per day (PPD) (if she is going out or on a trip). Fluid intake: 1 liters water/day; bladder irritants include: none. Pt does not limit fluid intake due to bladder control. Bowel: Frequency once every 2-3 days. Positive for pain with bowel movement, pushing/straining with bowel movement and constipation. Negative for fecal incontinence and incomplete emptying. Pt does not use pads for bowel protection; 0 pads per day (PPD). Use of stool softeners or laxatives? NO. Uses [place] wafer ever morning (fiber). Has IBS which is controlled by medicine  Sexual: Pt is not sexually active  positive for dyspareunia (history of). Pelvic Organ Prolapse/Pelvic Pain: Location: none. OB History: Number of pregnancies: 1 Number of vaginal deliveries: 1 Number of c-sections: 1. External Observations:  · Voluntary contraction: []? absent     [x]? present  · Involuntary contraction: []? absent     [x]? present  · Involuntary relaxation: []? absent     [x]? present  · Perineal descent at rest: []? absent     [x]? present  · Perineal descent with bearing: []? absent     [x]? present  · Perineum during cough \"the Knack\" - intact  · Clitoral Roach Mobility: decreased  · Postural assessment: bilateral BKA's  · Gait: uses WC for longer ambulation     Pelvic Floor Muscle (PFM) Assessment:  · Vaginal vault size: []? decreased     []? increased     [x]? WNL  · Muscle volume: []? decreased     [x]? WNL     []? Defect  · PFM tension: [x]? decreased     []? increased     []? WNL     Contraction ability:  Voluntary contraction: []? absent     [x]? weak     []? moderate      []? strong  Voluntary relaxation: []? absent     []? partial     [x]? complete   MMT: 2/5   PFM endurance: 3 seconds   Repetitions of endurance contractions: 5  Overflow: []? absent     []? min     []? mod     []? severe / Compensatory mm groups include         Tissue laxity test:  Anterior wall: []? minimum     []? moderate     []? severe      [x]? WNL  Posterior wall: []? minimum     []? moderate     []? severe      [x]? WNL        Palpation: via vaginal canal   Superficial Pelvic Floor Musculature (PFM): Tender? Intermediate PFM Tender? Deep PFM Tender? Superficial Transverse Perineal []? Right  []? Left  []? DNT Deep Transverse Perineal []? Right  []? Left  []? DNT Puborectalis []? Right  []? Left  []? DNT   Ischiocavernosus []? Right  []? Left  []? DNT Compressor Urethra []? Right  []? Left  []? DNT Pubococcygeus []? Right  []? Left  []? DNT   Bulbocavernosus []? Right  []? Left  []? DNT     Ileococcygeus []? Right  []? Left  []? DNT           Obturator Internus []? Right  []? Left  []? DNT           Coccygeus []? Right  []? Left  []? DNT              TREATMENT:   THERAPEUTIC EXERCISE: (45 minutes):  Exercises per grid below to improve mobility, strength, and coordination. Required minimal visual, verbal, and manual cues to promote proper body alignment, promote proper body posture, and promote proper body mechanics. Progressed resistance, range, and repetitions as indicated. All exercises performed with emphasis on kegel and breathing in order improve coordination, awareness and to minimize symptoms.     Date:  3-30-22 Date:  4-5-22 Date:  4-27-22 Date:  5-3-22   Activity/Exercise Parameters Parameters Parameters    Patient Education Discussed HEP and POC      kegels Quick flicks and endurance holds      Supine hip adduction ball squeeze    x20 x20 x20   Supine hip abduction with band    x20 purple band x20 purple band x20 purple band   2nd position bridge    x20 x20 x20   Seated march  x20 B x20 B purple band x20 B purple band   SLR   x20 B   x20 B   clamshells   x20 B   x20 B   Sit to stand     x20         Pt gives verbal consent to internal  assessment/treatment no chaperon present. NEURO REEDUCATION: () to improve control and coordination of pelvic floor     Date:   Date:   Date:     Activity/Exercise Parameters Parameters Parameters   Biofeedback With sEMG was utilized for coordination of PFM. MANUAL THERAPY: () to improve soft tissue tone    Date Type Location Comments   5/3/2022 Internal assessment/treatment                                           (Used abbreviations: MET - muscle energy technique; SCS- Strain counter strain; CTM-Connective tissue mobilizations; CR- Contract/relax; SP- Sustained pressure, TrP-Trigger point release, IASTM- Instrument assisted soft tissue mobilizations, TDN-Trigger point dry needling)    THERAPEUTIC ACTIVITY: ( 0 minutes):   TA Educational Topic Notes Date Completed   Pathology/Anatomy/PFM Function     Bladder health education Reviewed for IC prevention and care. Diet, water intake, marshmallow root/aloe vera. Given website [employer] 4-5-22 4-27-22   Urinary urge suppression Reviewed and given handout 4-5-22   The knack     Voiding strategies     Nocturia tips     Bowel/Bladder log     Bowel health education     Constipation care     Diarrhea/Fecal leakage     Colonic massage     Toilet positioning     Defecation dynamics     Sources of fiber     Return to intercourse/Dilator training     Sexual positioning     Lubricants/vaginal moisturizers     Vaginal irritants Educated on vaginal hygiene to prevent UTI 4-5-22   Body mechanics     Posture/ergonomics     Diaphragmatic breathing     Resources and technology         [employer]     Treatment/Session Summary:  Pt reports good understanding of plan of care, as well as prescribed home exercise program.  All questions were answered to pt's satisfaction.  Pt was invited to call with any further questions or concerns. · Response to Treatment:  Patient   · Communication/Consultation:  None today  · Equipment provided today:  None today  · Recommendations/Intent for next treatment session: Next visit will focus on coordination of PFM.   · Variation from POC: N/A  Total Treatment Billable Duration:  40 minutes there ex  PT Patient Time In/Time Out  Time In: 1400  Time Out: 9[address] MAR[address] Aranda DPT    Future Appointments   Date Time Provider Marin Flores   5/10/2022  2:00 PM Khushboo Kuo DPT [facility]   5/17/2022  1:00 PM Khushboo Kuo DPT [facility]

## 2022-05-03 ENCOUNTER — HOSPITAL ENCOUNTER (OUTPATIENT)
Dept: PHYSICAL THERAPY | Age: 72
Discharge: HOME | End: 2022-05-03
Payer: MEDICARE

## 2022-05-03 PROCEDURE — 97110 THERAPEUTIC EXERCISES: CPT

## 2022-05-09 NOTE — PROGRESS NOTES
Lawson Kimball  : 1950  [place]  Secondary: [facility]  [address] [place] [address]  [phone]   [phone]        OUTPATIENT PHYSICAL THERAPY: Daily Treatment Note 5/10/2022  ICD-10: Treatment Diagnosis: Lack of coordination (muscle incoordination) (R27.8), Pelvic floor dysfunction in female (M62.98), Generalized weakness (M62.81) and Urge incontinence (N39.41)  Precautions/Allergies:   Bactrim [sulfamethoprim], Ciprofloxacin, and Pcn [penicillins]   TREATMENT PLAN:  Effective Dates: 3/30/2022 TO 2022 (90 days). Frequency/Duration: 1 time a week for 90 Day(s) MEDICAL/REFERRING DIAGNOSIS:  Interstitial cystitis (chronic) without hematuria [N30.10]   DATE OF ONSET:   REFERRING PHYSICIAN: Linsey Vallejo MD Orders: evaluate and treat  Return MD Appointment: 22     Pre-treatment Symptoms/Complaints:  Patient reports nothing is new. Pain: Initial:   0 Post Session:  0/10   Medications Last Reviewed:  5/10/2022   Updated Objective Findings:   Ms. Héctor Shoemaker is a 79 yo female referred to pelvic floor physical therapy (PFPT) by Linsey Vallejo,* 2/ IC. Pt reports that symptoms began . It started with an ache that intensified. She has been to several doctors. Gynecologists didn't know what to do. Went to urogynecologist which is when they made the diagnosis. Has tried bladder instillations which did not help. She ended up at pain management as was put on tramadol. Her gynecologist put her on elmiron. This combined with diet got things under control. She is avoiding tomatoes, citrus, berries, pecans. This past December her internist saw that Elmiron was not good and was told to get off of it. She has gradually weaned off of that. She also went to [facility] who then recommended PT, marshmallow root, and aloe vera. She hasn't had any pain. She is here without symptoms.    Urinary: Frequency 5-8 x/day, 1-2 x/night. Positive for urge urinary incontinence. Negative for stress urinary incontinence, urinary urgency, urinary frequency, incomplete emptying, urinary hesitancy/intermittency, dysuria, hematuria, nocturia and nocturnal enuresis. Pt does use pads for urinary protection; 1 pads per day (PPD) (if she is going out or on a trip). Fluid intake: 1 liters water/day; bladder irritants include: none. Pt does not limit fluid intake due to bladder control. Bowel: Frequency once every 2-3 days. Positive for pain with bowel movement, pushing/straining with bowel movement and constipation. Negative for fecal incontinence and incomplete emptying. Pt does not use pads for bowel protection; 0 pads per day (PPD). Use of stool softeners or laxatives? NO. Uses [place] wafer ever morning (fiber). Has IBS which is controlled by medicine  Sexual: Pt is not sexually active  positive for dyspareunia (history of). Pelvic Organ Prolapse/Pelvic Pain: Location: none. OB History: Number of pregnancies: 1 Number of vaginal deliveries: 1 Number of c-sections: 1. External Observations:  · Voluntary contraction: []? absent     [x]? present  · Involuntary contraction: []? absent     [x]? present  · Involuntary relaxation: []? absent     [x]? present  · Perineal descent at rest: []? absent     [x]? present  · Perineal descent with bearing: []? absent     [x]? present  · Perineum during cough \"the Knack\" - intact  · Clitoral Roach Mobility: decreased  · Postural assessment: bilateral BKA's  · Gait: uses WC for longer ambulation     Pelvic Floor Muscle (PFM) Assessment:  · Vaginal vault size: []? decreased     []? increased     [x]? WNL  · Muscle volume: []? decreased     [x]? WNL     []? Defect  · PFM tension: [x]? decreased     []? increased     []? WNL     Contraction ability:  Voluntary contraction: []? absent     [x]? weak     []? moderate      []? strong  Voluntary relaxation: []?  absent []? partial     [x]? complete   MMT: 2/5   PFM endurance: 3 seconds   Repetitions of endurance contractions: 5  Overflow: []? absent     []? min     []? mod     []? severe / Compensatory mm groups include         Tissue laxity test:  Anterior wall: []? minimum     []? moderate     []? severe      [x]? WNL  Posterior wall: []? minimum     []? moderate     []? severe      [x]? WNL        Palpation: via vaginal canal   Superficial Pelvic Floor Musculature (PFM): Tender? Intermediate PFM Tender? Deep PFM Tender? Superficial Transverse Perineal []? Right  []? Left  []? DNT Deep Transverse Perineal []? Right  []? Left  []? DNT Puborectalis []? Right  []? Left  []? DNT   Ischiocavernosus []? Right  []? Left  []? DNT Compressor Urethra []? Right  []? Left  []? DNT Pubococcygeus []? Right  []? Left  []? DNT   Bulbocavernosus []? Right  []? Left  []? DNT     Ileococcygeus []? Right  []? Left  []? DNT           Obturator Internus []? Right  []? Left  []? DNT           Coccygeus []? Right  []? Left  []? DNT              TREATMENT:   THERAPEUTIC EXERCISE: (55 minutes):  Exercises per grid below to improve mobility, strength, and coordination. Required minimal visual, verbal, and manual cues to promote proper body alignment, promote proper body posture, and promote proper body mechanics. Progressed resistance, range, and repetitions as indicated. All exercises performed with emphasis on kegel and breathing in order improve coordination, awareness and to minimize symptoms.     Date:  3-30-22 Date:  4-5-22 Date:  4-27-22 Date:  5-3-22 Date:  5-10-22   Activity/Exercise Parameters Parameters Parameters     Patient Education Discussed HEP and POC    Made and reviewed new HEP incorporating back exercises   kegels Quick flicks and endurance holds       Supine hip adduction ball squeeze    x20 x20 x20    Supine hip abduction with band    x20 purple band x20 purple band x20 purple band    2nd position bridge    x20 x20 x20    Seated march x20 B x20 B purple band x20 B purple band    SLR   x20 B   x20 B    clamshells   x20 B   x20 B    Sit to stand     x20       3rd position brige     Purple band x 20   Supine march     x20 B     Sidelying hip abduction     x20 B     Prone alternating hip/arm extension     x20 B     Prone glute sets with kegel     x20        Pt gives verbal consent to internal  assessment/treatment no chaperon present. NEURO REEDUCATION: () to improve control and coordination of pelvic floor     Date:   Date:   Date:     Activity/Exercise Parameters Parameters Parameters   Biofeedback With sEMG was utilized for coordination of PFM. MANUAL THERAPY: () to improve soft tissue tone    Date Type Location Comments   5/10/2022 Internal assessment/treatment                                           (Used abbreviations: MET - muscle energy technique; SCS- Strain counter strain; CTM-Connective tissue mobilizations; CR- Contract/relax; SP- Sustained pressure, TrP-Trigger point release, IASTM- Instrument assisted soft tissue mobilizations, TDN-Trigger point dry needling)    THERAPEUTIC ACTIVITY: ( 0 minutes):   TA Educational Topic Notes Date Completed   Pathology/Anatomy/PFM Function     Bladder health education Reviewed for IC prevention and care. Diet, water intake, marshmallow root/aloe vera.  Given website [facility] [employer] 4-5-22 4-27-22   Urinary urge suppression Reviewed and given handout 4-5-22   The knack     Voiding strategies     Nocturia tips     Bowel/Bladder log     Bowel health education     Constipation care     Diarrhea/Fecal leakage     Colonic massage     Toilet positioning     Defecation dynamics     Sources of fiber     Return to intercourse/Dilator training     Sexual positioning     Lubricants/vaginal moisturizers     Vaginal irritants Educated on vaginal hygiene to prevent UTI 4-5-22   Body mechanics     Posture/ergonomics     Diaphragmatic breathing     Resources and technology [employer] Portal     Treatment/Session Summary:  Pt reports good understanding of plan of care, as well as prescribed home exercise program.  All questions were answered to pt's satisfaction. Pt was invited to call with any further questions or concerns. · Response to Treatment:  Patient able to complete there ex today. Given new HEP  · Communication/Consultation:  None today  · Equipment provided today:  None today  · Recommendations/Intent for next treatment session: Next visit will focus on coordination of PFM.   · Variation from POC: N/A  Total Treatment Billable Duration:  55 minutes there ex  PT Patient Time In/Time Out  Time In: 1400  Time Out: [address] DPT    Future Appointments   Date Time Provider Marin Flores   5/17/2022  1:00 PM MARSHALL [facility]

## 2022-05-10 ENCOUNTER — HOSPITAL ENCOUNTER (OUTPATIENT)
Dept: PHYSICAL THERAPY | Age: 72
Discharge: HOME | End: 2022-05-10
Payer: MEDICARE

## 2022-05-10 PROCEDURE — 97110 THERAPEUTIC EXERCISES: CPT

## 2022-05-17 ENCOUNTER — HOSPITAL ENCOUNTER (OUTPATIENT)
Dept: PHYSICAL THERAPY | Age: 72
Discharge: HOME | End: 2022-05-17
Payer: MEDICARE

## 2022-05-17 PROCEDURE — 97110 THERAPEUTIC EXERCISES: CPT

## 2022-05-17 NOTE — THERAPY DISCHARGE
Gerardo Amor  : 1950  [place]  Secondary: [facility] [place] [facility]  [address] [place] [address]  [phone]   [phone]        OUTPATIENT PHYSICAL [facility] 2022   ICD-10: Treatment Diagnosis: Lack of coordination (muscle incoordination) (R27.8), Pelvic floor dysfunction in female (M62.98), Generalized weakness (M62.81) and Urge incontinence (N39.41)  Precautions/Allergies:   Bactrim [sulfamethoprim], Ciprofloxacin, and Pcn [penicillins]    MEDICAL/REFERRING DIAGNOSIS:  Interstitial cystitis (chronic) without hematuria [N30.10]   DATE OF ONSET:   REFERRING PHYSICIAN: Karyn Arellano MD Orders: evaluate and treat  Return MD Appointment: -   DISCHARGE SUMMARY: Ms. Saranya Ott has been seen in skilled PT from 3-30-22 to 22. Patient has attended 6 out of 7 scheduled visits, with 1 cancellation(s) and 0 no shows. Treatment has emphasized bladder health education and coordination of PFM. Patient responded well to therapy, with improvements in better awareness of PFM and bladder. Pt has achieved goals as indicated below and all questions have been answered to their satisfaction. Pt was invited to call with any further questions or concerns as needed. INITIAL ASSESSMENT: Gerardo Amor presents with musculoskeletal limitations including reduced coordination and awareness of PFM and decreased pelvic floor muscle (PFM) strength. These limitations are impairing the patient's ability to properly coordinate perineal elevation and descent for normalized PFM function, contributing to urinary dysfunction. As a result, the patient is limited in her/his ability to participate in household chores, traveling by car or bus for a distance greater then 30 minutes away from home and social activities outside of the home.       GOALS: (Goals have been discussed and agreed upon with patient.)  Short-Term Functional Goals: Time Frame:   1. Pt will demonstrate I with basic PFM HEP to improve awareness, coordination, and timing of PFM. MET  1. Patient will demonstrate ability to isolate a pelvic floor contraction without breath holding and minimal to no accessory muscle use in order to implement the knack and/or urge suppression MET  2. Patient will demonstrate appropriate use of the pelvic floor muscle group (quick flicks and/or drops), without compensation, to implement urge suppression appropriately with urgency of urination and decrease the number of pads per day or UI episodes MET  3. Patient will demonstrate appropriate co-contraction of the transversus abdominis and pelvic floor muscle group during exhalation in order to reduce IAP and improve functional task performance  MET  Discharge Goals: Time Frame:   4. Patient will demonstrate independence with an advanced HEP for general conditioning, core stabilization, and mobility to facilitate carry over and independent management of symptoms. MET    Urinary: Frequency 5-8 x/day, 1x/night. Positive for urge urinary incontinence. Negative for stress urinary incontinence, urinary urgency, urinary frequency, incomplete emptying, urinary hesitancy/intermittency, dysuria, hematuria, nocturia and nocturnal enuresis. Pt does use pads for urinary protection; 1 pads per day (PPD) (if she is going out or on a trip). Fluid intake: 1 liters water/day; bladder irritants include: none. Pt does not limit fluid intake due to bladder control. Bowel: Frequency once every 2-3 days. Positive for pain with bowel movement, pushing/straining with bowel movement and constipation. Negative for fecal incontinence and incomplete emptying. Pt does not use pads for bowel protection; 0 pads per day (PPD). Use of stool softeners or laxatives? NO. Uses [place] wafer ever morning (fiber).  Has IBS which is controlled by medicine  Sexual: Pt is not sexually active  positive for dyspareunia (history of). Pelvic Organ Prolapse/Pelvic Pain: Location: none. OB History: Number of pregnancies: 1 Number of vaginal deliveries: 1 Number of c-sections: 1. External Observations:  · Voluntary contraction: []? absent     [x]? present  · Involuntary contraction: []? absent     [x]? present  · Involuntary relaxation: []? absent     [x]? present  · Perineal descent at rest: []? absent     [x]? present  · Perineal descent with bearing: []? absent     [x]? present  · Perineum during cough \"the Knack\" - intact  · Clitoral Roach Mobility: decreased  · Postural assessment: bilateral BKA's  · Gait: uses WC for longer ambulation     Pelvic Floor Muscle (PFM) Assessment:  · Vaginal vault size: []? decreased     []? increased     [x]? WNL  · Muscle volume: []? decreased     [x]? WNL     []? Defect  · PFM tension: [x]? decreased     []? increased     []? WNL     Contraction ability:  Voluntary contraction: []? absent     [x]? weak     []? moderate      []? strong  Voluntary relaxation: []? absent     []? partial     [x]? complete   MMT: 2/5   PFM endurance: 3 seconds   Repetitions of endurance contractions: 5  Overflow: []? absent     []? min     []? mod     []? severe / Compensatory mm groups include         Tissue laxity test:  Anterior wall: []? minimum     []? moderate     []? severe      [x]? WNL  Posterior wall: []? minimum     []? moderate     []? severe      [x]? WNL        Palpation: via vaginal canal   Superficial Pelvic Floor Musculature (PFM): Tender? Intermediate PFM Tender? Deep PFM Tender? Superficial Transverse Perineal []? Right  []? Left  []? DNT Deep Transverse Perineal []? Right  []? Left  []? DNT Puborectalis []? Right  []? Left  []? DNT   Ischiocavernosus []? Right  []? Left  []? DNT Compressor Urethra []? Right  []? Left  []? DNT Pubococcygeus []? Right  []? Left  []? DNT   Bulbocavernosus []? Right  []? Left  []? DNT     Ileococcygeus []? Right  []? Left  []? DNT           Obturator Internus []?  Right []? Left  []? DNT           Coccygeus []? Right  []? Left  []? DNT        OUTCOME MEASURE:   Tool Used: Pelvic Floor Impact Questionnaireshort form 7 (PFIQ-7)   Score:  Initial:   · Bladder or Urine: 42/100  · Bowel or Rectum: 0/100  · Vagina or Pelvis: 0/100 Most Recent: X (Date: 5-17-22)  · Bladder or Urine: 23  · Bowel or Rectum: 0  · Vagina or Pelvis: 0   Interpretation of Score: Each of the 7 sections is scored on a scale from 0-3; 0 representing \"Not at all\", 3 representing \"Quite a bit\". The mean value is taken from all the answered items, then multiplied by 100 to obtain the scale score, ranging from 0-100. This process is repeated for each column representing bowel, bladder, and pelvic pain.     Nakia Sifuentes, MARSHALLT

## 2022-05-17 NOTE — PROGRESS NOTES
Gege Oliverio  : 1950  [place]  [place] [address] [facility]  [address] [place] [address]  [phone]   [phone]        OUTPATIENT PHYSICAL THERAPY: Daily Treatment Note 2022  ICD-10: Treatment Diagnosis: Lack of coordination (muscle incoordination) (R27.8), Pelvic floor dysfunction in female (M62.98), Generalized weakness (M62.81) and Urge incontinence (N39.41)  Precautions/Allergies:   Bactrim [sulfamethoprim], Ciprofloxacin, and Pcn [penicillins]   TREATMENT PLAN:  Effective Dates: 3/30/2022 TO 2022 (90 days). Frequency/Duration: 1 time a week for 90 Day(s) MEDICAL/REFERRING DIAGNOSIS:  Interstitial cystitis (chronic) without hematuria [N30.10]   DATE OF ONSET:   REFERRING PHYSICIAN: Yinka Adorno MD Orders: evaluate and treat  Return MD Appointment: 22     Pre-treatment Symptoms/Complaints:  Patient reports no changes. She has a colonoscopy and EGD Friday and it was hard for her. All the prep was hard. It usually isn't that bad. Took her several days to get back to herself. She did not do hardly any of the exercises. She has noticed slight improvement in leakage but hasn't been consistent enough with her exercises to make a difference. Pain: Initial:   0 Post Session:  0/10   Medications Last Reviewed:  2022   Updated Objective Findings:   Ms. Harmeet Frank is a 81 yo female referred to pelvic floor physical therapy (PFPT) by Yinka Adorno,* 2/2 IC. Pt reports that symptoms began . It started with an ache that intensified. She has been to several doctors. Gynecologists didn't know what to do. Went to urogynecologist which is when they made the diagnosis. Has tried bladder instillations which did not help. She ended up at pain management as was put on tramadol. Her gynecologist put her on elmiron. This combined with diet got things under control.  She is avoiding tomatoes, citrus, berries, pecans. This past December her internist saw that Elmiron was not good and was told to get off of it. She has gradually weaned off of that. She also went to [facility] who then recommended PT, marshmallow root, and aloe vera. She hasn't had any pain. She is here without symptoms.       Urinary: Frequency 5-8 x/day, 1-2 x/night. Positive for urge urinary incontinence. Negative for stress urinary incontinence, urinary urgency, urinary frequency, incomplete emptying, urinary hesitancy/intermittency, dysuria, hematuria, nocturia and nocturnal enuresis. Pt does use pads for urinary protection; 1 pads per day (PPD) (if she is going out or on a trip). Fluid intake: 1 liters water/day; bladder irritants include: none. Pt does not limit fluid intake due to bladder control. Bowel: Frequency once every 2-3 days. Positive for pain with bowel movement, pushing/straining with bowel movement and constipation. Negative for fecal incontinence and incomplete emptying. Pt does not use pads for bowel protection; 0 pads per day (PPD). Use of stool softeners or laxatives? NO. Uses [place] wafer ever morning (fiber). Has IBS which is controlled by medicine  Sexual: Pt is not sexually active  positive for dyspareunia (history of). Pelvic Organ Prolapse/Pelvic Pain: Location: none. OB History: Number of pregnancies: 1 Number of vaginal deliveries: 1 Number of c-sections: 1. External Observations:  · Voluntary contraction: []? absent     [x]? present  · Involuntary contraction: []? absent     [x]? present  · Involuntary relaxation: []? absent     [x]? present  · Perineal descent at rest: []? absent     [x]? present  · Perineal descent with bearing: []? absent     [x]?  present  · Perineum during cough \"the Knack\" - intact  · Clitoral Roach Mobility: decreased  · Postural assessment: bilateral BKA's  · Gait: uses WC for longer ambulation     Pelvic Floor Muscle (PFM) Assessment:  · Vaginal vault size: []? decreased     []? increased     [x]? WNL  · Muscle volume: []? decreased     [x]? WNL     []? Defect  · PFM tension: [x]? decreased     []? increased     []? WNL     Contraction ability:  Voluntary contraction: []? absent     [x]? weak     []? moderate      []? strong  Voluntary relaxation: []? absent     []? partial     [x]? complete   MMT: 2/5   PFM endurance: 3 seconds   Repetitions of endurance contractions: 5  Overflow: []? absent     []? min     []? mod     []? severe / Compensatory mm groups include         Tissue laxity test:  Anterior wall: []? minimum     []? moderate     []? severe      [x]? WNL  Posterior wall: []? minimum     []? moderate     []? severe      [x]? WNL        Palpation: via vaginal canal   Superficial Pelvic Floor Musculature (PFM): Tender? Intermediate PFM Tender? Deep PFM Tender? Superficial Transverse Perineal []? Right  []? Left  []? DNT Deep Transverse Perineal []? Right  []? Left  []? DNT Puborectalis []? Right  []? Left  []? DNT   Ischiocavernosus []? Right  []? Left  []? DNT Compressor Urethra []? Right  []? Left  []? DNT Pubococcygeus []? Right  []? Left  []? DNT   Bulbocavernosus []? Right  []? Left  []? DNT     Ileococcygeus []? Right  []? Left  []? DNT           Obturator Internus []? Right  []? Left  []? DNT           Coccygeus []? Right  []? Left  []? DNT              TREATMENT:   THERAPEUTIC EXERCISE: (25 minutes):  Exercises per grid below to improve mobility, strength, and coordination. Required minimal visual, verbal, and manual cues to promote proper body alignment, promote proper body posture, and promote proper body mechanics. Progressed resistance, range, and repetitions as indicated. All exercises performed with emphasis on kegel and breathing in order improve coordination, awareness and to minimize symptoms.     Date:  3-30-22 Date:  4-5-22 Date:  4-27-22 Date:  5-3-22 Date:  5-10-22 Date:  5-17-22   Activity/Exercise Parameters Parameters Parameters Patient Education Discussed HEP and POC    Made and reviewed new HEP incorporating back exercises Reviewed HEP and answered questions   luis e Quick flicks and endurance holds        Supine hip adduction ball squeeze    x20 x20 x20     Supine hip abduction with band    x20 purple band x20 purple band x20 purple band     2nd position bridge    x20 x20 x20     Seated march  x20 B x20 B purple band x20 B purple band     SLR   x20 B   x20 B     clamshells   x20 B   x20 B     Sit to stand     x20        3rd position brige     Purple band x 20    Supine march     x20 B      Sidelying hip abduction     x20 B   Reviewed positioning and technique x20 B   Prone alternating hip/arm extension     x20 B      Prone glute sets with kegel     x20         Pt gives verbal consent to internal  assessment/treatment no chaperon present. NEURO REEDUCATION: () to improve control and coordination of pelvic floor     Date:   Date:   Date:     Activity/Exercise Parameters Parameters Parameters   Biofeedback With sEMG was utilized for coordination of PFM. MANUAL THERAPY: () to improve soft tissue tone    Date Type Location Comments   5/17/2022 Internal assessment/treatment                                           (Used abbreviations: MET - muscle energy technique; SCS- Strain counter strain; CTM-Connective tissue mobilizations; CR- Contract/relax; SP- Sustained pressure, TrP-Trigger point release, IASTM- Instrument assisted soft tissue mobilizations, TDN-Trigger point dry needling)    THERAPEUTIC ACTIVITY: ( 0 minutes):   TA Educational Topic Notes Date Completed   Pathology/Anatomy/PFM Function     Bladder health education Reviewed for IC prevention and care. Diet, water intake, marshmallow root/aloe vera.  Given website [employer] 4-5-22 4-27-22   Urinary urge suppression Reviewed and given handout 4-5-22   The knack     Voiding strategies     Nocturia tips     Bowel/Bladder log     Bowel health education     Constipation care     Diarrhea/Fecal leakage     Colonic massage     Toilet positioning     Defecation dynamics     Sources of fiber     Return to intercourse/Dilator training     Sexual positioning     Lubricants/vaginal moisturizers     Vaginal irritants Educated on vaginal hygiene to prevent UTI 4-5-22   Body mechanics     Posture/ergonomics     Diaphragmatic breathing     Resources and technology         [employer] Portal     Treatment/Session Summary:  Pt reports good understanding of plan of care, as well as prescribed home exercise program.  All questions were answered to pt's satisfaction. Pt was invited to call with any further questions or concerns. Total Treatment Billable Duration:  25 minutes there ex  PT Patient Time In/Time Out  Time In: 1300  Time Out: [address], DPT    No future appointments.